# Patient Record
Sex: FEMALE | Race: WHITE | NOT HISPANIC OR LATINO | Employment: OTHER | URBAN - METROPOLITAN AREA
[De-identification: names, ages, dates, MRNs, and addresses within clinical notes are randomized per-mention and may not be internally consistent; named-entity substitution may affect disease eponyms.]

---

## 2019-01-31 ENCOUNTER — NEW PATIENT COMPREHENSIVE (OUTPATIENT)
Dept: URBAN - METROPOLITAN AREA CLINIC 39 | Facility: CLINIC | Age: 79
End: 2019-01-31

## 2019-01-31 DIAGNOSIS — H43.813: ICD-10-CM

## 2019-01-31 DIAGNOSIS — H40.013: ICD-10-CM

## 2019-01-31 DIAGNOSIS — H25.812: ICD-10-CM

## 2019-01-31 DIAGNOSIS — H25.811: ICD-10-CM

## 2019-01-31 DIAGNOSIS — H02.833: ICD-10-CM

## 2019-01-31 DIAGNOSIS — H02.836: ICD-10-CM

## 2019-01-31 PROCEDURE — 92004 COMPRE OPH EXAM NEW PT 1/>: CPT

## 2019-01-31 PROCEDURE — 92133 CPTRZD OPH DX IMG PST SGM ON: CPT

## 2019-01-31 PROCEDURE — 92015 DETERMINE REFRACTIVE STATE: CPT

## 2019-01-31 ASSESSMENT — VISUAL ACUITY
OS_CC: J1
OS_BAT: 20/100 W/ MR
OD_BAT: <20/400 W/ MR
OS_SC: <J12
OS_SC: 20/70-1
OD_SC: 20/100
OD_SC: <J12
OD_CC: J3

## 2019-01-31 ASSESSMENT — TONOMETRY
OD_IOP_MMHG: 16
OS_IOP_MMHG: 18

## 2020-01-31 ENCOUNTER — ESTABLISHED COMPREHENSIVE EXAM (OUTPATIENT)
Dept: URBAN - METROPOLITAN AREA CLINIC 39 | Facility: CLINIC | Age: 80
End: 2020-01-31

## 2020-01-31 DIAGNOSIS — H25.812: ICD-10-CM

## 2020-01-31 DIAGNOSIS — H52.203: ICD-10-CM

## 2020-01-31 DIAGNOSIS — H43.813: ICD-10-CM

## 2020-01-31 DIAGNOSIS — H25.811: ICD-10-CM

## 2020-01-31 DIAGNOSIS — H40.013: ICD-10-CM

## 2020-01-31 DIAGNOSIS — H52.03: ICD-10-CM

## 2020-01-31 DIAGNOSIS — H02.833: ICD-10-CM

## 2020-01-31 DIAGNOSIS — H02.836: ICD-10-CM

## 2020-01-31 PROCEDURE — 92015 DETERMINE REFRACTIVE STATE: CPT

## 2020-01-31 PROCEDURE — 92014 COMPRE OPH EXAM EST PT 1/>: CPT

## 2020-01-31 ASSESSMENT — VISUAL ACUITY
OD_SC: <J10
OU_CC: 20/30-2
OD_BAT: <20/400
OS_BAT: <20/400
OD_SC: 20/100
OS_SC: <J10
OS_SC: 20/100
OD_CC: 20/60
OS_CC: J3
OD_CC: J3
OS_CC: 20/30-2

## 2020-01-31 ASSESSMENT — TONOMETRY
OD_IOP_MMHG: 18
OS_IOP_MMHG: 18

## 2021-02-02 ENCOUNTER — ESTABLISHED COMPREHENSIVE EXAM (OUTPATIENT)
Dept: URBAN - METROPOLITAN AREA CLINIC 35 | Facility: CLINIC | Age: 81
End: 2021-02-02

## 2021-02-02 DIAGNOSIS — H52.03: ICD-10-CM

## 2021-02-02 DIAGNOSIS — H02.836: ICD-10-CM

## 2021-02-02 DIAGNOSIS — H04.123: ICD-10-CM

## 2021-02-02 DIAGNOSIS — H43.813: ICD-10-CM

## 2021-02-02 DIAGNOSIS — H25.811: ICD-10-CM

## 2021-02-02 DIAGNOSIS — H25.812: ICD-10-CM

## 2021-02-02 DIAGNOSIS — H02.833: ICD-10-CM

## 2021-02-02 DIAGNOSIS — H40.013: ICD-10-CM

## 2021-02-02 PROCEDURE — 92133 CPTRZD OPH DX IMG PST SGM ON: CPT

## 2021-02-02 PROCEDURE — 92014 COMPRE OPH EXAM EST PT 1/>: CPT

## 2021-02-02 PROCEDURE — 92015 DETERMINE REFRACTIVE STATE: CPT

## 2021-02-02 ASSESSMENT — VISUAL ACUITY
OU_SC: 20/70
OD_SC: 20/70-2
OU_CC: J1
OD_PH: 20/40
OS_PH: 20/40
OS_SC: 20/70-1
OD_CC: J2
OS_CC: J3

## 2021-02-02 ASSESSMENT — TONOMETRY
OS_IOP_MMHG: 18
OD_IOP_MMHG: 16

## 2022-02-18 NOTE — PATIENT DISCUSSION
Patient states had 1 episode of vertical blacked out vision OS, lasted 30 ,minutes, since resolved, No obvious ocular cause, No scalp tenderness, No jaw pain, No evidence of GCA, Follow up with PCP- Check 2 d echo, carotid doppler R/O emboli.

## 2022-02-18 NOTE — PATIENT DISCUSSION
Patient states had 1 episode of vertical blacked out vision OS, since resolved, See Amaurosis Fugax OS for IMP/Plan.

## 2022-03-28 NOTE — PATIENT DISCUSSION
MEM 3/28/22: Pt is to call Dr Db Holloway to have him review her test results as [facility] has not received copies as of this date.

## 2022-03-28 NOTE — PATIENT DISCUSSION
Preventive Health Recommendations  Female Ages 40 to 49    Yearly exam:     See your health care provider every year in order to  1. Review health changes.   2. Discuss preventive care.    3. Review your medicines if your doctor prescribed any.      Get a Pap test every three years (unless you have an abnormal result and your provider advises testing more often).      If you get Pap tests with HPV test, you only need to test every 5 years, unless you have an abnormal result. You do not need a Pap test if your uterus was removed (hysterectomy) and you have not had cancer.      You should be tested each year for STDs (sexually transmitted diseases), if you're at risk.     Ask your doctor if you should have a mammogram.      Have a colonoscopy (test for colon cancer) if someone in your family has had colon cancer or polyps before age 50.       Have a cholesterol test every 5 years.       Have a diabetes test (fasting glucose) after age 45. If you are at risk for diabetes, you should have this test every 3 years.    Shots: Get a flu shot each year. Get a tetanus shot every 10 years.     Nutrition:     Eat at least 5 servings of fruits and vegetables each day.    Eat whole-grain bread, whole-wheat pasta and brown rice instead of white grains and rice.    Get adequate Calcium and Vitamin D.      Lifestyle    Exercise at least 150 minutes a week (an average of 30 minutes a day, 5 days a week). This will help you control your weight and prevent disease.    Limit alcohol to one drink per day.    No smoking.     Wear sunscreen to prevent skin cancer.    See your dentist every six months for an exam and cleaning.   Use artificial tears to affected eye(s) as needed.

## 2022-03-28 NOTE — PATIENT DISCUSSION
TPB: Patient states had 1 episode of vertical blacked out vision OS, lasted 30 ,minutes, since resolved, No obvious ocular cause, No scalp tenderness, No jaw pain, No evidence of GCA, Follow up with PCP- Check 2 d echo, carotid doppler R/O emboli.

## 2022-11-10 NOTE — PATIENT DISCUSSION
MEM 3/28/22: Pt is to call Dr Banuelos [facility] to have him review her test results as [facility] has not received copies as of this date.

## 2024-05-23 ENCOUNTER — NEW PATIENT (OUTPATIENT)
Dept: URBAN - METROPOLITAN AREA CLINIC 35 | Facility: CLINIC | Age: 84
End: 2024-05-23

## 2024-05-23 DIAGNOSIS — H40.013: ICD-10-CM

## 2024-05-23 DIAGNOSIS — H52.7: ICD-10-CM

## 2024-05-23 DIAGNOSIS — H25.811: ICD-10-CM

## 2024-05-23 DIAGNOSIS — H04.123: ICD-10-CM

## 2024-05-23 DIAGNOSIS — H43.813: ICD-10-CM

## 2024-05-23 PROCEDURE — 92133 CPTRZD OPH DX IMG PST SGM ON: CPT

## 2024-05-23 PROCEDURE — 92015 DETERMINE REFRACTIVE STATE: CPT

## 2024-05-23 PROCEDURE — 92004 COMPRE OPH EXAM NEW PT 1/>: CPT

## 2024-05-23 ASSESSMENT — VISUAL ACUITY
OS_CC: 20/70
OD_SC: 20/400
OD_CC: 20/200
OS_SC: 20/400
OU_SC: >J12
OD_CC: J1
OS_SC: >J12
OD_SC: >J12
OU_CC: J1
OS_CC: J3
OU_CC: 20/70+1
OU_SC: 20/400

## 2024-05-23 ASSESSMENT — TONOMETRY
OS_IOP_MMHG: 18
OD_IOP_MMHG: 17

## 2024-06-21 ENCOUNTER — PREPPED CHART (OUTPATIENT)
Dept: URBAN - METROPOLITAN AREA CLINIC 39 | Facility: CLINIC | Age: 84
End: 2024-06-21